# Patient Record
Sex: MALE | Race: WHITE | NOT HISPANIC OR LATINO
[De-identification: names, ages, dates, MRNs, and addresses within clinical notes are randomized per-mention and may not be internally consistent; named-entity substitution may affect disease eponyms.]

---

## 2022-12-02 ENCOUNTER — APPOINTMENT (OUTPATIENT)
Dept: UROLOGY | Facility: CLINIC | Age: 22
End: 2022-12-02

## 2022-12-02 VITALS
WEIGHT: 125 LBS | HEART RATE: 86 BPM | SYSTOLIC BLOOD PRESSURE: 140 MMHG | BODY MASS INDEX: 20.09 KG/M2 | RESPIRATION RATE: 16 BRPM | HEIGHT: 66 IN | OXYGEN SATURATION: 98 % | TEMPERATURE: 98.8 F | DIASTOLIC BLOOD PRESSURE: 88 MMHG

## 2022-12-02 DIAGNOSIS — Z78.9 OTHER SPECIFIED HEALTH STATUS: ICD-10-CM

## 2022-12-02 PROBLEM — Z00.00 ENCOUNTER FOR PREVENTIVE HEALTH EXAMINATION: Status: ACTIVE | Noted: 2022-12-02

## 2022-12-02 PROCEDURE — 99204 OFFICE O/P NEW MOD 45 MIN: CPT

## 2022-12-02 NOTE — ASSESSMENT
[FreeTextEntry1] : He has a very elevated FSH, testicles that are bigger than I expected from such an FSH but smaller and softer than normal, his LH is midrange that his testosterone is low.\par \par Low to moderate keep the blood tests getting a slightly broader panel including a prolactin and a bioavailable testosterone, going to send him for scrotal ultrasound to see if there is any anatomical issues as well they can hopefully check in for a varicocele, I am going to want a post vaginal semen analysis low with an FSH of 24 it may show azoospermia\par \par As well though there appears to be significant male factor here, he cannot have a baby without the female partner and she has not had an evaluation.  Anything we may or may not do to help him improve his fertility potential will have to be decided based on what her fertility potential is as well.  Therefore she needs to go see an artery sooner than later.\par \par I will hold off on genetic testing until I see what the repeat samples as well as the semen analysis show

## 2022-12-02 NOTE — PHYSICAL EXAM
[General Appearance - Well Developed] : well developed [General Appearance - Well Nourished] : well nourished [Normal Appearance] : normal appearance [Well Groomed] : well groomed [General Appearance - In No Acute Distress] : no acute distress [Heart Rate And Rhythm] : Heart rate and rhythm were normal [Edema] : no peripheral edema [Respiration, Rhythm And Depth] : normal respiratory rhythm and effort [Exaggerated Use Of Accessory Muscles For Inspiration] : no accessory muscle use [Auscultation Breath Sounds / Voice Sounds] : lungs were clear to auscultation bilaterally [Abdomen Soft] : soft [Abdomen Tenderness] : non-tender [Abdomen Hernia] : no hernia was discovered [Costovertebral Angle Tenderness] : no ~M costovertebral angle tenderness [Penis Abnormality] : normal circumcised penis [Normal Station and Gait] : the gait and station were normal for the patient's age [] : no rash [Oriented To Time, Place, And Person] : oriented to person, place, and time [Affect] : the affect was normal [Mood] : the mood was normal [FreeTextEntry1] : Very anxious

## 2022-12-02 NOTE — LETTER BODY
[Dear  ___] : Dear ~JAYCEE, [Consult Letter:] : I had the pleasure of evaluating your patient, [unfilled]. [Please see my note below.] : Please see my note below. [Consult Closing:] : Thank you very much for allowing me to participate in the care of this patient.  If you have any questions, please do not hesitate to contact me. [Sincerely,] : Sincerely, [FreeTextEntry2] : Rabbi Renny Singleton\par [address]\[place] [address]

## 2022-12-02 NOTE — HISTORY OF PRESENT ILLNESS
[FreeTextEntry1] : Edi is a 22-year-old male who is  for 2 years and 3 months to Sahara was also 22 years old.  Despite regular relations during the clean portion of her cycle of about twice per week they have not yet conceived.  He had an evaluation which showed low testosterone and elevated FSH so she has not yet seen anyone.  Her cycle lasts about 6 days she can get cramps that can be severe 10 of 10 and she can get cycles where she has no discomfort and normal.\par \par He is 1 of 11 with 4 brothers and 6 sisters.  He has 1 brother who is  a year his sister-in-law is not yet pregnant that he is aware of he has a sister who had problems, however it was his brother in law and thank God they now have 1 child.  His wife Ratna, has 2 brothers and 1 sister  all have children. [Currently Experiencing ___] :  [unfilled]

## 2022-12-02 NOTE — LETTER HEADER
[FreeTextEntry3] : Juana Malik M.D.\par Director Emeritus of Urology\par [facility]\par [address] [place] [address], [address]\[place] [address]

## 2023-01-06 ENCOUNTER — APPOINTMENT (OUTPATIENT)
Dept: UROLOGY | Facility: CLINIC | Age: 23
End: 2023-01-06
Payer: MEDICAID

## 2023-01-06 VITALS
SYSTOLIC BLOOD PRESSURE: 133 MMHG | WEIGHT: 123 LBS | BODY MASS INDEX: 19.77 KG/M2 | HEART RATE: 98 BPM | DIASTOLIC BLOOD PRESSURE: 74 MMHG | HEIGHT: 66 IN

## 2023-01-06 PROCEDURE — 99215 OFFICE O/P EST HI 40 MIN: CPT

## 2023-02-10 ENCOUNTER — APPOINTMENT (OUTPATIENT)
Dept: UROLOGY | Facility: CLINIC | Age: 23
End: 2023-02-10
Payer: MEDICAID

## 2023-02-10 VITALS
HEIGHT: 66 IN | WEIGHT: 123 LBS | BODY MASS INDEX: 19.77 KG/M2 | OXYGEN SATURATION: 98 % | SYSTOLIC BLOOD PRESSURE: 135 MMHG | HEART RATE: 92 BPM | TEMPERATURE: 98.1 F | RESPIRATION RATE: 16 BRPM | DIASTOLIC BLOOD PRESSURE: 80 MMHG

## 2023-02-10 PROCEDURE — 99214 OFFICE O/P EST MOD 30 MIN: CPT

## 2023-02-10 NOTE — HISTORY OF PRESENT ILLNESS
[FreeTextEntry1] : Edi is a 22-year-old male born October 21, 2000 was seen January 6, 2023.  He and his wife Sahara have been  for 2-1/2 years and evaluation had shown low testosterone, elevated FSH, 16 cc testicles and repeat bloods done on December 5 showed an FSH of 3 times normal at 25.6, LH of 6.7, estradiol of 18, sex hormone binding globulin of 42 with bioavailable testosterone being low at 83 (110–5 75) semen analysis in [address] [place] [address] for sperm count of 0 and I felt that neither of the hypergonadism or varicocele will remain issues and I wanted genetic testing and depending on the results we would consider hormonal manipulation I also wanted to repeat the semen analysis to see if that was real.  They have it done the second week in January and they do not know for sure but they think there was nothing showed up.  We are still awaiting that fax but we have the genetic testing

## 2023-02-10 NOTE — LETTER BODY
[Dear  ___] : Dear ~JAYCEE, [Courtesy Letter:] : I had the pleasure of seeing your patient, [unfilled], in my office today. [Please see my note below.] : Please see my note below. [Sincerely,] : Sincerely, [FreeTextEntry2] : Rabbi Renny Singleton\par [address]\[place] [address]

## 2023-02-10 NOTE — ASSESSMENT
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  If we can get his gonadotropins high enough that some sperm gets produced then IVF is on the table if nothing is found that there is always an extended semen analysis and if nothing is found there is always testicular sperm extraction.  Before I go to testicular sperm extraction I would recommend fixing the varicocele but I would hold off on surgery until we get to that point.  I understand it can delay things 3 to 6 months but they are both still young and though I know they really want grandchildren if we could avoid surgery by waiting 6 months it would be worthwhile.  This is especially so as we have no guarantees that the surgery would help this is all in someway elastic check.

## 2023-02-10 NOTE — PHYSICAL EXAM
[General Appearance - Well Developed] : well developed [General Appearance - Well Nourished] : well nourished [Normal Appearance] : normal appearance [Well Groomed] : well groomed [General Appearance - In No Acute Distress] : no acute distress [] : no respiratory distress [Respiration, Rhythm And Depth] : normal respiratory rhythm and effort [Exaggerated Use Of Accessory Muscles For Inspiration] : no accessory muscle use [Oriented To Time, Place, And Person] : oriented to person, place, and time [Affect] : the affect was normal [Mood] : the mood was normal [Normal Station and Gait] : the gait and station were normal for the patient's age [FreeTextEntry1] : Very anxious

## 2023-02-10 NOTE — LETTER BODY
[Dear  ___] : Dear ~JAYCEE, [Courtesy Letter:] : I had the pleasure of seeing your patient, [unfilled], in my office today. [Please see my note below.] : Please see my note below. [Sincerely,] : Sincerely, [FreeTextEntry2] : Rabbi Renny Singleton\par [address]\[place] [address]

## 2023-02-10 NOTE — LETTER HEADER
[FreeTextEntry3] : Juana Malik M.D.\par Director Emeritus of Urology\par Director of Male Infertility\par [facility]\par [address] [place] [address], [facility] [address]\[place] [address]

## 2023-02-10 NOTE — LETTER HEADER
[FreeTextEntry3] : Juana Malik M.D.\par Director Emeritus of Urology\par [facility]\par [address] [place] [address], [address]\[place] [address]

## 2023-02-10 NOTE — ASSESSMENT
[FreeTextEntry1] : He has an FSH that 3 times normal without any sperm seen and low free and bioavailable testosterone with a normal LH.  The estradiol is 18 which is low normal.  He has a ? small left varicocele bilaterally small testicles.\par \par I do not think a varicocele is the issue and though the hypOgonadism is an issue I do not think that is the cause of the subfertility.  We need genetic testing and depending on the results we may consider manipulating his hormones and then seeing if something works.  The COUPLE asked to whether or not there will be a need or in fact any utility to testicular sperm extraction and to a large extent would be predicated on the genetic testing\par \par We will also repeat the semen analysis to see if this is indeed azo or oligospermia\par \par I reviewed the genetic issue such as a balanced translocation, y microdeletions, et.,  we discussed the pituitary gonadal axis including that the FSH being elevated is not the problem the FSH being elevated means the testicle is not properly producing sperm and the pituitary responds to that.  As well we discussed the LH - Leydig cell axis and with the LH in the normal range why is the testosterone so low.  We can stimulate the testicle to make more testosterone but in the absence of genetic material that will not be of benefit.  Please note an extended semen analysis may also be of benefit in the future but right now we are not doing that\par \par We will get a repeat semen analysis, repeat hormones, get Y microdeletions and chromosome analysis and then they will come back.  We will then decide if this is something that can be handled medically i.e. Clomid, hCG etc., will we need to fix the varicocele, and or do we need an extended semen analysis and/or microsurgical dissection with testicular sperm extraction or worst-case scenario if he is Sertoli cell only\par \par Finally though the exam was acceptable I am going to order CF testing\par

## 2023-02-10 NOTE — HISTORY OF PRESENT ILLNESS
[FreeTextEntry1] : Edi is a 22-year-old male born October 24, 2000 initially seen December 2, 2022 for male infertility.  He had his wife Sahara all  for almost 2-1/2 years without conception.  On evaluation done before he saw me showed a low testosterone and elevated FSH with the exam showing small small testicles at about 16 cc.  The LH was in the mid range with a low testosterone and we elected to get a broader panel including a prolactin of bioavailable as well as a scrotal ultrasound we wanted a semen analysis post vaginal if necessary with the semen collection device is possible that was done on December 16, 2022 and depending on the results we would consider genetic testing and in the meantime his wife should see a reproductive endocrinologist; she has not gone as of yet.

## 2023-02-24 ENCOUNTER — NON-APPOINTMENT (OUTPATIENT)
Age: 23
End: 2023-02-24

## 2023-03-17 ENCOUNTER — APPOINTMENT (OUTPATIENT)
Dept: UROLOGY | Facility: CLINIC | Age: 23
End: 2023-03-17
Payer: MEDICAID

## 2023-03-17 VITALS
TEMPERATURE: 96.9 F | HEIGHT: 66 IN | DIASTOLIC BLOOD PRESSURE: 79 MMHG | WEIGHT: 128 LBS | BODY MASS INDEX: 20.57 KG/M2 | SYSTOLIC BLOOD PRESSURE: 127 MMHG | HEART RATE: 70 BPM | RESPIRATION RATE: 16 BRPM | OXYGEN SATURATION: 98 %

## 2023-03-17 DIAGNOSIS — E80.6 OTHER DISORDERS OF BILIRUBIN METABOLISM: ICD-10-CM

## 2023-03-17 PROCEDURE — 99214 OFFICE O/P EST MOD 30 MIN: CPT

## 2023-03-17 NOTE — HISTORY OF PRESENT ILLNESS
[FreeTextEntry1] : Edi is a 22-year-old male born October 21, 2000 last seen February 10, 2023.  His evaluation had shown low testosterone with an elevated FSH and testing showed no obvious genetic cause with an LH in the upper range FSH 3 times normal and low testosterone.  The question we had was with Clomid raising his LH and FSH even higher, with fixing his varicocele we have any benefit and we decided trying Clomid which is a low risk option is of unproven benefit and essentially last ditch effort.  If it fails he can go for testicular sperm extraction if it works we can hope that an extended semen analysis might find something.  Whether or not the varicocele would be of benefit as a separate issue.  We arranged for Clomid 50 mg every other day, repeat blood tests and then we will see if it is worth continuing with the Clomid, going for microsurgical dissection or going for a varicocele repair and then a microsurgical dissection if the varicocele repair does not think.\par \par Blood tests were done on March 9, 2023 and is here for review he tells me subjectively he feels no change.

## 2023-03-17 NOTE — PHYSICAL EXAM
[General Appearance - Well Developed] : well developed [General Appearance - Well Nourished] : well nourished [Normal Appearance] : normal appearance [Well Groomed] : well groomed [General Appearance - In No Acute Distress] : no acute distress [Abdomen Soft] : soft [Abdomen Tenderness] : non-tender [Abdomen Hernia] : no hernia was discovered [Costovertebral Angle Tenderness] : no ~M costovertebral angle tenderness [Heart Rate And Rhythm] : Heart rate and rhythm were normal [Edema] : no peripheral edema [] : no respiratory distress [Respiration, Rhythm And Depth] : normal respiratory rhythm and effort [Exaggerated Use Of Accessory Muscles For Inspiration] : no accessory muscle use [Oriented To Time, Place, And Person] : oriented to person, place, and time [Affect] : the affect was normal [Not Anxious] : not anxious [Mood] : the mood was normal [Normal Station and Gait] : the gait and station were normal for the patient's age [FreeTextEntry1] : Fields of View were normal

## 2023-03-17 NOTE — LETTER BODY
[Dear  ___] : Dear ~JAYCEE, [Courtesy Letter:] : I had the pleasure of seeing your patient, [unfilled], in my office today. [Please see my note below.] : Please see my note below. [Sincerely,] : Sincerely, [FreeTextEntry2] : Rabbi Renny Singleton\par [address]\[place] [address]

## 2023-03-17 NOTE — ASSESSMENT
[FreeTextEntry1] : The testosterone is completely normal if anything is towards the upper side it is hard to believe but I will take it.  The estradiol is a little high at 43 but I rather continue to hyper drive him and do what we can.  Please note the bilirubin which was 1.2 total before is now 1.8 with both the direct and indirect going up a little.  I am going to repeat the liver function tests and have him speak to his PCP about it.  I really do not want to stop the Clomid unless we have to as I do not really have much else other than injectables that is both uncomfortable and very expensive.  For now he will continue the Clomid at 50 mg every other day we will get hormones in 2-1/2 and he will see me in 3 months.  If his rabbi wants we can get a semen analysis in 3 months that we can usually take 6 months before we see a change

## 2023-03-17 NOTE — LETTER HEADER
[FreeTextEntry3] : Juana Malik M.D.\par Director Emeritus of Urology\par Director of Male Infertility\par [facility]\par [address] [place] [address], [facility] [address]\[place] [address]

## 2023-03-21 ENCOUNTER — NON-APPOINTMENT (OUTPATIENT)
Age: 23
End: 2023-03-21

## 2023-06-23 ENCOUNTER — APPOINTMENT (OUTPATIENT)
Dept: UROLOGY | Facility: CLINIC | Age: 23
End: 2023-06-23
Payer: MEDICAID

## 2023-06-23 VITALS
OXYGEN SATURATION: 96 % | RESPIRATION RATE: 14 BRPM | WEIGHT: 128 LBS | SYSTOLIC BLOOD PRESSURE: 136 MMHG | DIASTOLIC BLOOD PRESSURE: 80 MMHG | HEIGHT: 66 IN | TEMPERATURE: 98.2 F | BODY MASS INDEX: 20.57 KG/M2 | HEART RATE: 73 BPM

## 2023-06-23 PROCEDURE — 99214 OFFICE O/P EST MOD 30 MIN: CPT

## 2023-06-23 NOTE — HISTORY OF PRESENT ILLNESS
[FreeTextEntry1] : Edi is a 22-year-old male born October 21, 2000 last seen February 10, 2023.  His evaluation had shown low testosterone with an elevated FSH and testing showed no obvious genetic cause with an LH in the upper range FSH 3 times normal and low testosterone.  The question we had was with Clomid raising his LH and FSH even higher, would fixing his  borderline unilateral left varicocele have any benefit.\par \par we decided trying Clomid which is a low risk option is of unproven benefit and essentially last ditch effort.  If it fails he can go for testicular sperm extraction if it works we can hope that an extended semen analysis might find something.  Whether or not the varicocele would be of benefit as a separate issue.  We arranged for Clomid 50 mg every other day, repeat blood tests and then we will see if it is worth continuing with the Clomid, going for microsurgical dissection or going for a varicocele repair and then a microsurgical dissection if the varicocele repair does not think.\par \par Blood tests were done on 06/14/2023 and is here for review he tells me subjectively he feels no change.

## 2023-06-23 NOTE — ASSESSMENT
[FreeTextEntry1] : His testosterone is good on Clomid. His labs demonstrated high FSH almost 3 times the upper limit of normal and post vaginal semen analysis shows complete azoospermia.\par \par It is likely that he will need an MDTESE however, prior to that he should have an extended semen analysis.  If they can find sperm with an extended semen analysis in the same.  They understand that at best the scrotum they can fight if they fully will be utilized IVF with intracytoplasmic spermatozoa injection\par \par We will keep him on Clomid 50 mg every other day and obtain blood work and 2 and half months with follow-up shortly after.\par \par He will discuss extended semen analysis with his Rabbi and if necessary have his rabbi.  A copy of this note has been given to him

## 2023-06-23 NOTE — LETTER BODY
[Dear  ___] : Dear ~JAYCEE, [Courtesy Letter:] : I had the pleasure of seeing your patient, [unfilled], in my office today. [Please see my note below.] : Please see my note below. [Sincerely,] : Sincerely, [FreeTextEntry2] : Rabbi Renny Singleton\par [address]\[place] [address]

## 2023-06-23 NOTE — LETTER HEADER
[FreeTextEntry3] : Juana Malik M.D.\par Director Emeritus of Urology\par Director of Male Infertility\par [facility]\par [address] [place] [address], [facility] [address]\[place] [address]

## 2023-10-13 ENCOUNTER — APPOINTMENT (OUTPATIENT)
Dept: UROLOGY | Facility: CLINIC | Age: 23
End: 2023-10-13
Payer: MEDICAID

## 2023-10-13 VITALS
HEART RATE: 67 BPM | HEIGHT: 66 IN | SYSTOLIC BLOOD PRESSURE: 121 MMHG | DIASTOLIC BLOOD PRESSURE: 78 MMHG | RESPIRATION RATE: 18 BRPM | WEIGHT: 130 LBS | TEMPERATURE: 98 F | OXYGEN SATURATION: 97 % | BODY MASS INDEX: 20.89 KG/M2

## 2023-10-13 PROCEDURE — 99215 OFFICE O/P EST HI 40 MIN: CPT

## 2023-10-13 RX ORDER — CLOMIPHENE CITRATE 50 MG/1
50 TABLET ORAL
Qty: 15 | Refills: 3 | Status: ACTIVE | COMMUNITY
Start: 2023-02-10 | End: 1900-01-01

## 2023-10-31 ENCOUNTER — APPOINTMENT (OUTPATIENT)
Dept: UROLOGY | Facility: HOSPITAL | Age: 23
End: 2023-10-31

## 2024-03-01 ENCOUNTER — APPOINTMENT (OUTPATIENT)
Dept: UROLOGY | Facility: CLINIC | Age: 24
End: 2024-03-01
Payer: MEDICAID

## 2024-03-01 VITALS
TEMPERATURE: 98.2 F | SYSTOLIC BLOOD PRESSURE: 129 MMHG | WEIGHT: 131 LBS | DIASTOLIC BLOOD PRESSURE: 74 MMHG | BODY MASS INDEX: 21.05 KG/M2 | HEART RATE: 89 BPM | HEIGHT: 66 IN

## 2024-03-01 DIAGNOSIS — R79.89 OTHER SPECIFIED ABNORMAL FINDINGS OF BLOOD CHEMISTRY: ICD-10-CM

## 2024-03-01 PROCEDURE — 99214 OFFICE O/P EST MOD 30 MIN: CPT

## 2024-03-01 PROCEDURE — G2211 COMPLEX E/M VISIT ADD ON: CPT | Mod: NC,1L

## 2024-03-01 RX ORDER — CLOMIPHENE CITRATE 100 %
POWDER (GRAM) MISCELLANEOUS
Qty: 1.5 | Refills: 1 | Status: ACTIVE | COMMUNITY
Start: 2024-03-01 | End: 1900-01-01

## 2024-03-01 NOTE — LETTER BODY
[Dear  ___] : Dear ~JAYCEE, [Courtesy Letter:] : I had the pleasure of seeing your patient, [unfilled], in my office today. [Please see my note below.] : Please see my note below. [Sincerely,] : Sincerely, [FreeTextEntry2] : Rabbi Renny Singleton\par  [address]\[place] [address]

## 2024-03-01 NOTE — PHYSICAL EXAM
[General Appearance - Well Developed] : well developed [General Appearance - Well Nourished] : well nourished [Normal Appearance] : normal appearance [Well Groomed] : well groomed [General Appearance - In No Acute Distress] : no acute distress [Heart Rate And Rhythm] : heart rate and rhythm were normal [Edema] : no peripheral edema [Respiration, Rhythm And Depth] : normal respiratory rhythm and effort [Exaggerated Use Of Accessory Muscles For Inspiration] : no accessory muscle use [Auscultation Breath Sounds / Voice Sounds] : lungs were clear to auscultation bilaterally [Abdomen Soft] : soft [Abdomen Tenderness] : non-tender [Costovertebral Angle Tenderness] : no ~M costovertebral angle tenderness [Normal Station and Gait] : the gait and station were normal for the patient's age [] : no rash [No Focal Deficits] : no focal deficits [Oriented To Time, Place, And Person] : oriented to person, place, and time [Affect] : the affect was normal [Mood] : the mood was normal [Not Anxious] : not anxious

## 2024-03-01 NOTE — ASSESSMENT
[FreeTextEntry1] : The blood test on the same as exam is the same he is feeling is the same and I do not disagree embolization versus surgery though the success rate with embolization is not as high as that of surgery if it does not work we can always go to surgery.  As well as I reviewed with them I am not someone who at this point feels that the varicocele should be treated but if that is the route we are going I asked that this alternative.  The next question I have is should he get an extended semen analysis again now that he has been on the medication for another 4 months.  The most we have to lose is money.  If they have sperm then it avoids the embolization.  They ask even if he has sperm should he do the embolization and make it even more better in the answers I will think is necessary in the first place and if he already has sperm and nothing is without risk I would just use the sperm that is found.  For now I will continue him on the Clomid we will get blood in 3-1/2 and see me in 4 months at the time I see him the next time I hope will have good news.  I do not want him stopping the Clomid even if cogwheeling she becomes pregnant as there is still the issue of testosterone levels that are necessary for general male health.

## 2024-03-01 NOTE — HISTORY OF PRESENT ILLNESS
[FreeTextEntry1] : Edi is a 22-year-old male born [birth year] last seen on 10/13/2023.  His evaluation had shown a low testosterone with an elevated FSH with no obvious genetic cause. The LH was upper range of the FSH 3 times normal we wondered if with Clomid raising them even further plus minus fixing his unilateral left varicocele will have any benefit. We started with the Clomid and discussed whether and if that does not yield ejaculated sperm without or with an extended semen analysis for testicular sperm extraction would be of benefit and whether or not fixing the varicocele would be worthwhile and then going for the extended semen analysis plus minus microsurgical dissection that was a totality the later study showed that there is no improvement yield. At his last visit we decided to keep him on the Clomid 50 mg every other day get blood work and go on from there. He was going to discuss an extended semen analysis with his rabbi and if needed have the rabbi call me. They saw Dr. Ray who feels that he needs an extended testicular sperm extraction but feels that the varicocele should be fixed first acknowledging that current data shows limited benefit. However he feels if there is any benefit it should be done prior as microsurgical testicular sperm extraction is really one-shot deal. They do acknowledge that if they do the varicocele they would wait at least 6 months and then do a repeat extended semen analysis before they consider a microsurgical approach.   We discussed the risk benefits of varicocele surgery, please see below including the fact that no matter how many anyone has done there is no operation that has 0 risk.   We reviewed  the fact that the likelihood is that the operation will not yield a change in sperm production. However they had spoken with the rabbi and decided what ever attempts can be made to try and improve sperm production especially if it we might therby avoid the need to operate on the testicle itself, as long as the surgery has an acceptable risk profile, is worth doing.   Rabbi Landau decided that they should go with embolization rather than surgery and they are waiting for insurance approval as the specialist who does the embolization does not see any reason to meet with them unless they are going to go ahead with the procedure and I think that is appropriate.  In the meantime he is staying on the Clomid at 50 mg every other day, it has been 4 months since I last saw him and he had blood test done every 26 2024 and is here for review and exam and if necessary refill  Total testosterone was 970 Free testosterone was 118 Bioavailable testosterone was 284 Sex hormone binding globulin was 41 with albumin of 5.3 Hemoglobin was 14.4 with a platelet count of 163,000 FSH was 26.9 Estradiol was 42 LH was 6.7 Liver function tests were normal [Currently Experiencing ___] :  [unfilled]

## 2024-03-01 NOTE — LETTER HEADER
[FreeTextEntry3] : Juana Malik MD [facility] [facility], [address] [place] [address]

## 2024-06-24 ENCOUNTER — APPOINTMENT (OUTPATIENT)
Dept: UROLOGY | Facility: CLINIC | Age: 24
End: 2024-06-24
Payer: MEDICAID

## 2024-06-24 VITALS
TEMPERATURE: 98.2 F | DIASTOLIC BLOOD PRESSURE: 87 MMHG | HEART RATE: 79 BPM | BODY MASS INDEX: 20.89 KG/M2 | OXYGEN SATURATION: 99 % | HEIGHT: 66 IN | WEIGHT: 130 LBS | SYSTOLIC BLOOD PRESSURE: 144 MMHG

## 2024-06-24 DIAGNOSIS — I86.1 SCROTAL VARICES: ICD-10-CM

## 2024-06-24 DIAGNOSIS — N46.01 ORGANIC AZOOSPERMIA: ICD-10-CM

## 2024-06-24 DIAGNOSIS — N46.9 MALE INFERTILITY, UNSPECIFIED: ICD-10-CM

## 2024-06-24 PROCEDURE — 99204 OFFICE O/P NEW MOD 45 MIN: CPT

## 2024-06-24 NOTE — HISTORY OF PRESENT ILLNESS
[FreeTextEntry1] : Dear Dr. Malik (Urologist)  Thank you so much for the referral to help care for your patient.  Chief Complaint: Varicocele  Date of first visit: 06/24/2024  YAMIL KELLER  is a 23 year old [employer] gentleman who presents for LEFT side varicocele.  They are helped by Rabbi Landau.  The patient reports pain.  on clomiphen citrate.  no family hx of cystic fibrosis.   SA 6/23/23 Vol 2.6, azospermia.    SA 6 2024 as per patient still has azospermia   U/S Hx: U/S scrotum doppler     12/02/2022 small left varicoceles. - standing with valsva 2.9 reversal of flow present   (2/26/24) Total testosterone was 970  Free testosterone was 118 Bioavailable testosterone was 284 Sex hormone binding globulin was 41 with albumin of 5.3 Hemoglobin was 14.4 with a platelet count of 163,000 FSH was 26.9 Estradiol was 42 LH was 6.7 Liver function tests were normal  The patient denies fevers, chills, nausea and or vomiting and no unexplained weight loss.  All pertinent laboratory, films and physician notes were reviewed.  Questionnaire results were discussed with patient.

## 2024-06-24 NOTE — ASSESSMENT
[FreeTextEntry1] : 24 y/o male with left varicocele with azospermia on clomiphen citrate. Grade I with pain.  i did discuss that given non palp but pain ew can consider treatment with sedation.  Semen analysis - report - Dr Malik recommended surgery and they have reviewed SA but I don't have the actual report to confirm non obstructive azospermia.   1.Repeat US offered - the patient does not tolerate US of the scrotum.  This is a challenge because the prior US is old.  Clinical options in TESE vs (treatment of varicocele) -  The question we had was with Clomid raising his LH and FSH even higher, with fixing his varicocele we have any benefit and we decided trying Clomid which is a low risk option is of unproven benefit and essentially last ditch effort. If it fails he can go for testicular sperm extraction if it works we can hope that an extended semen analysis might find something.  The Rabbi recommended 1st step treat the varicocele.   We discussed disease process and treatment options for symptomatic varicoceles and infertility. The patient understands the scrotal pain may resolve the heaviness that he feels in his scrotum however improving his fertility is not 100% guaranteed. There are studies that show there is improvement in semen parameters after treatment of the varicocele.  We discussed the risks and benefits alternatives associated with gonadal embolization regards to nontarget embolization complications of access and migration of coils. We also discussed the success rates of procedures in comparison to surgery.  We had an in depth conversation regarding the benefit of varicocelectomy today. He understands the literature which overwhelming reports a benefit in improvement in semen parameters. He also understands that there is limited data in terms of spontaneous pregnancy and take home baby rates.There is good data suggesting improved semen parameters which can possibly obviate the need for future IVF and has been shown to improve IVF outcomes in selected patients. We discussed the recent results of a Rell review which reported that one natural pregnancy is achieved for every 3-5 varicocele repairs over an unknown time frame. We also spoke about the fact that there is a 3 to 6 month delay before improvement is seen in semen parameters. The role of advancing maternal age was discussed in depth.  Surgical risks, including a risk of infection, injury to the testicular artery (extremely rare), injury to the vas deferens and hydrocele were discussed, as was post operative pain and pain control. Post operative skin numbness in the anterior thigh/lateral scrotum were also discussed.  Malampatti I  1. Motrin 800 mg po BID start of the procedure 2. Labs C,7 3. Follow up 1 month after procedure 4. 4-6 months US and Semen analysis (69 days for new sperm to mature)  Thank you very much for allowing me to assist in the care of this patient. Please do not hesitate to contact me with any additional questions or concerns.   Sincerely,   Pranay Diaz D.O. Professor of Urology and Radiology  of Urology at [facility] Director for Prostate Cancer [address] [place] [address], 5th Floor [facility], [facility] Phone: [phone].

## 2024-06-24 NOTE — PHYSICAL EXAM
[General Appearance - Well Developed] : well developed [General Appearance - Well Nourished] : well nourished [Not Anxious] : not anxious [de-identified] : bilateral vas deferense present.

## 2024-06-25 LAB
ANION GAP SERPL CALC-SCNC: 14 MMOL/L
BUN SERPL-MCNC: 9 MG/DL
CALCIUM SERPL-MCNC: 10.1 MG/DL
CHLORIDE SERPL-SCNC: 102 MMOL/L
CO2 SERPL-SCNC: 25 MMOL/L
CREAT SERPL-MCNC: 0.77 MG/DL
EGFR: 128 ML/MIN/1.73M2
GLUCOSE SERPL-MCNC: 102 MG/DL
HCT VFR BLD CALC: 43.4 %
HGB BLD-MCNC: 14.8 G/DL
MCHC RBC-ENTMCNC: 31.2 PG
MCHC RBC-ENTMCNC: 34.1 GM/DL
MCV RBC AUTO: 91.4 FL
PLATELET # BLD AUTO: 199 K/UL
POTASSIUM SERPL-SCNC: 4.2 MMOL/L
RBC # BLD: 4.75 M/UL
RBC # FLD: 12.1 %
SODIUM SERPL-SCNC: 141 MMOL/L
WBC # FLD AUTO: 9.86 K/UL

## 2024-07-02 ENCOUNTER — APPOINTMENT (OUTPATIENT)
Dept: UROLOGY | Facility: HOSPITAL | Age: 24
End: 2024-07-02

## 2024-07-02 ENCOUNTER — APPOINTMENT (OUTPATIENT)
Dept: INTERVENTIONAL RADIOLOGY/VASCULAR | Facility: HOSPITAL | Age: 24
End: 2024-07-02

## 2024-07-02 ENCOUNTER — RESULT REVIEW (OUTPATIENT)
Age: 24
End: 2024-07-02

## 2024-07-02 ENCOUNTER — OUTPATIENT (OUTPATIENT)
Dept: OUTPATIENT SERVICES | Facility: HOSPITAL | Age: 24
LOS: 1 days | End: 2024-07-02
Payer: COMMERCIAL

## 2024-07-02 VITALS
HEIGHT: 66 IN | SYSTOLIC BLOOD PRESSURE: 130 MMHG | WEIGHT: 130.07 LBS | HEART RATE: 93 BPM | TEMPERATURE: 99 F | OXYGEN SATURATION: 98 % | RESPIRATION RATE: 16 BRPM | DIASTOLIC BLOOD PRESSURE: 79 MMHG

## 2024-07-02 PROCEDURE — 36012 PLACE CATHETER IN VEIN: CPT

## 2024-07-02 PROCEDURE — C1889: CPT

## 2024-07-02 PROCEDURE — 75831 VEIN X-RAY KIDNEY: CPT

## 2024-07-02 PROCEDURE — 37241 VASC EMBOLIZE/OCCLUDE VENOUS: CPT

## 2024-07-02 PROCEDURE — C1887: CPT

## 2024-07-02 PROCEDURE — 75831 VEIN X-RAY KIDNEY: CPT | Mod: 26,LT

## 2024-07-02 PROCEDURE — C1894: CPT

## 2024-07-02 PROCEDURE — C1769: CPT

## 2024-07-12 ENCOUNTER — APPOINTMENT (OUTPATIENT)
Dept: UROLOGY | Facility: CLINIC | Age: 24
End: 2024-07-12
Payer: MEDICAID

## 2024-07-12 VITALS
BODY MASS INDEX: 20.89 KG/M2 | SYSTOLIC BLOOD PRESSURE: 120 MMHG | TEMPERATURE: 97.5 F | WEIGHT: 130 LBS | HEIGHT: 66 IN | DIASTOLIC BLOOD PRESSURE: 80 MMHG | HEART RATE: 86 BPM

## 2024-07-12 DIAGNOSIS — I86.1 SCROTAL VARICES: ICD-10-CM

## 2024-07-12 DIAGNOSIS — R79.89 OTHER SPECIFIED ABNORMAL FINDINGS OF BLOOD CHEMISTRY: ICD-10-CM

## 2024-07-12 DIAGNOSIS — N46.01 ORGANIC AZOOSPERMIA: ICD-10-CM

## 2024-07-12 DIAGNOSIS — N46.9 MALE INFERTILITY, UNSPECIFIED: ICD-10-CM

## 2024-07-12 PROCEDURE — 99215 OFFICE O/P EST HI 40 MIN: CPT

## 2024-07-12 PROCEDURE — G2211 COMPLEX E/M VISIT ADD ON: CPT | Mod: NC,1L

## 2024-07-22 ENCOUNTER — APPOINTMENT (OUTPATIENT)
Dept: UROLOGY | Facility: CLINIC | Age: 24
End: 2024-07-22

## 2024-07-26 ENCOUNTER — APPOINTMENT (OUTPATIENT)
Dept: UROLOGY | Facility: CLINIC | Age: 24
End: 2024-07-26
Payer: MEDICAID

## 2024-07-26 VITALS
HEART RATE: 81 BPM | HEIGHT: 66 IN | RESPIRATION RATE: 16 BRPM | OXYGEN SATURATION: 98 % | TEMPERATURE: 98.5 F | SYSTOLIC BLOOD PRESSURE: 116 MMHG | DIASTOLIC BLOOD PRESSURE: 74 MMHG | BODY MASS INDEX: 20.89 KG/M2 | WEIGHT: 130 LBS

## 2024-07-26 DIAGNOSIS — R79.89 OTHER SPECIFIED ABNORMAL FINDINGS OF BLOOD CHEMISTRY: ICD-10-CM

## 2024-07-26 DIAGNOSIS — N46.01 ORGANIC AZOOSPERMIA: ICD-10-CM

## 2024-07-26 DIAGNOSIS — N46.9 MALE INFERTILITY, UNSPECIFIED: ICD-10-CM

## 2024-07-26 DIAGNOSIS — I86.1 SCROTAL VARICES: ICD-10-CM

## 2024-07-26 PROCEDURE — G2211 COMPLEX E/M VISIT ADD ON: CPT | Mod: NC,1L

## 2024-07-26 PROCEDURE — 99214 OFFICE O/P EST MOD 30 MIN: CPT

## 2024-07-26 NOTE — LETTER HEADER
[FreeTextEntry3] : Juana Malik MD [facility], [address] [place] [address]

## 2024-07-26 NOTE — PHYSICAL EXAM
[General Appearance - Well Developed] : well developed [General Appearance - Well Nourished] : well nourished [Normal Appearance] : normal appearance [Well Groomed] : well groomed [General Appearance - In No Acute Distress] : no acute distress [Heart Rate And Rhythm] : heart rate and rhythm were normal [Edema] : no peripheral edema [Respiration, Rhythm And Depth] : normal respiratory rhythm and effort [Exaggerated Use Of Accessory Muscles For Inspiration] : no accessory muscle use [Auscultation Breath Sounds / Voice Sounds] : lungs were clear to auscultation bilaterally [Abdomen Soft] : soft [Abdomen Tenderness] : non-tender [Costovertebral Angle Tenderness] : no ~M costovertebral angle tenderness [Normal Station and Gait] : the gait and station were normal for the patient's age [] : no rash [No Focal Deficits] : no focal deficits [Oriented To Time, Place, And Person] : oriented to person, place, and time [Affect] : the affect was normal [Mood] : the mood was normal [Not Anxious] : not anxious [FreeTextEntry1] : 20.98 [de-identified] : i do nto feel reflux [de-identified] : Fields of View were normal

## 2024-07-26 NOTE — LETTER BODY
[Dear  ___] : Dear ~JAYCEE, [Courtesy Letter:] : I had the pleasure of seeing your patient, [unfilled], in my office today. [Please see my note below.] : Please see my note below. [Sincerely,] : Sincerely, [FreeTextEntry2] : Rabbi Renny Singleton\par  [address]\[place] [address]

## 2024-07-26 NOTE — HISTORY OF PRESENT ILLNESS
[FreeTextEntry1] : Edi is a 23-year-old male born [birth year] last seen on 07/12/2024. His evaluation had shown a low testosterone with an elevated FSH with no obvious genetic cause. The LH was upper range of the FSH 3 times normal we wondered if with Clomid raising them even further plus minus fixing his unilateral left varicocele will have any benefit.  His exam was benign I do not have any of the labs that I need. they were to go back to the lab that jed the blood see what is going on as reportedly 3-5 tubes were taken  but they only ran 1   if they have the rest they will get them to me if they did not run them they will have to run them and then they will get that to me. once I have them we will meet over TeleMed.  The blood was in process and they are meeting with me today to review  4/30/2029 2024 with him on Clomid 50 mg every other day Total testosterone was 435 Free testosterone was 42.8 Bioavailable testosterone was 91.8 Sex hormone binding globulin was 46 with an albumin of 4.7 Liver function tests were normal Hemoglobin was 14.3 with a platelet count of 177,000 FSH was 23.6 (1.4-12.8) LH was 4.4 (1.5-5.3) estradiol was 39 [Currently Experiencing ___] :  [unfilled]

## 2024-07-26 NOTE — ASSESSMENT
[FreeTextEntry1] : For total testosterone is okay but his sex hormone binding globulin is near maximal.  The free is borderline above the bioavailable is more than 20% below the lower limit of normal. This is with his FSH is twice normal and the LH in the mid range.  The other issue is his estradiol is near maximal and he is a really skinny ginger.  I want his testosterone in the normal range so we will double the Clomid.  His estradiol may go up too high so we may need to add in Arimidex.  We will decide that at the next visit. Unfortunately because I am adding in a new drug I will need to do a physical exam so he will have to come in hopefully once we get him on a steady dose we can see him every other visit and then see him with a TeleMed.  He has trouble and that his community does not really want the right can file using the Internet because of the left patient but I discussed with him that one of the medical groups that I work with do have access in their office so that patients can use the Internet medical supervision and he will look into it.  The embolization was done in June and 3 months after the embolization was arranged for a post vaginal semen analysis.  I do not see any purpose in doing it before then and they are aware that even if it works 3 months would be the absolute earliest and it usually takes 6 and can take even up to 12.

## 2024-08-07 ENCOUNTER — APPOINTMENT (OUTPATIENT)
Dept: UROLOGY | Facility: CLINIC | Age: 24
End: 2024-08-07

## 2024-08-07 PROCEDURE — 99212 OFFICE O/P EST SF 10 MIN: CPT

## 2024-08-07 NOTE — ASSESSMENT
[FreeTextEntry1] : 24 y/o male with left varicocele with azospermia on clomiphen citrate. Grade I with pain. i did discuss that given non palp but pain we can consider treatment with sedation.  He has no pain post op.  Semen analysis - report - Dr Malik recommended surgery and they have reviewed SA but I don't have the actual report to confirm non obstructive azospermia.  s/p LEFT SIDE VARICOCELE EMBO on 7/2/24  - 5 months repeat semen analysis at [facility] and send results - no follow up needed if SA is poor - consider TESE with Dr Bowling.  Thank you very much for allowing me to assist in the care of this patient. Please do not hesitate to contact me with any additional questions or concerns.      Sincerely,     Pranay Diaz D.O. Professor of Urology and Radiology  of Urology at [facility] Director for Prostate Cancer [address] [place] [address], 5th Floor [facility], [facility] Phone: [phone]

## 2024-08-07 NOTE — HISTORY OF PRESENT ILLNESS
[FreeTextEntry1] : Dear Dr. Malik (Urologist)  Thank you so much for the referral to help care for your patient.  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 They are helped by Rabbi Landau. The patient reports pain. on clomiphen citrate. no family hx of cystic fibrosis.  He is doing well 1 month post op - he has no pain which he had pre-treatment.    SA 6/23/23 Vol 2.6, azospermia.  SA 6 2024 as per patient still has azospermia  U/S Hx: U/S scrotum doppler 12/02/2022 small left varicoceles. - standing with valsva 2.9 reversal of flow present  (2/26/24) Total testosterone was 970 Free testosterone was 118 Bioavailable testosterone was 284 Sex hormone binding globulin was 41 with albumin of 5.3 Hemoglobin was 14.4 with a platelet count of 163,000 FSH was 26.9 Estradiol was 42 LH was 6.7 Liver function tests were normal  The patient denies fevers, chills, nausea and or vomiting and no unexplained weight loss.  All pertinent laboratory, films and physician notes were reviewed. Questionnaire results were discussed with patient.

## 2024-08-07 NOTE — ASSESSMENT
[FreeTextEntry1] : 22 y/o male with left varicocele with azospermia on clomiphen citrate. Grade I with pain. i did discuss that given non palp but pain we can consider treatment with sedation.  He has no pain post op.  Semen analysis - report - Dr Malik recommended surgery and they have reviewed SA but I don't have the actual report to confirm non obstructive azospermia.  s/p LEFT SIDE VARICOCELE EMBO on 7/2/24  - 5 months repeat semen analysis at [facility] and send results - no follow up needed if SA is poor - consider TESE with Dr Bowling.  Thank you very much for allowing me to assist in the care of this patient. Please do not hesitate to contact me with any additional questions or concerns.      Sincerely,     Pranay Diaz D.O. Professor of Urology and Radiology  of Urology at [facility] Director for Prostate Cancer [address] [place] [address], 5th Floor [facility], [facility] Phone: [phone]

## 2024-08-07 NOTE — HISTORY OF PRESENT ILLNESS
[FreeTextEntry1] : Dear Dr. Malik (Urologist)  Thank you so much for the referral to help care for your patient.  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 They are helped by Rabbi Landau. The patient reports pain. on clomiphen citrate. no family hx of cystic fibrosis.  He is doing well 1 month post op - he has no pain which he had pre-treatment.    SA 6/23/23 Vol 2.6, azospermia.  SA 6 2024 as per patient still has azospermia  U/S Hx: U/S scrotum doppler 12/02/2022 small left varicoceles. - standing with valsva 2.9 reversal of flow present  (2/26/24) Total testosterone was 970 Free testosterone was 118 Bioavailable testosterone was 284 Sex hormone binding globulin was 41 with albumin of 5.3 Hemoglobin was 14.4 with a platelet count of 163,000 FSH was 26.9 Estradiol was 42 LH was 6.7 Liver function tests were normal  The patient denies fevers, chills, nausea and or vomiting and no unexplained weight loss.  All pertinent laboratory, films and physician notes were reviewed. Questionnaire results were discussed with patient.

## 2024-08-07 NOTE — HISTORY OF PRESENT ILLNESS
[FreeTextEntry1] : Dear Dr. Malik (Urologist)  Thank you so much for the referral to help care for your patient.  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 They are helped by Rabbi Landau. The patient reports pain. on clomiphen citrate. no family hx of cystic fibrosis.  He is doing well 1 month post op - he has no pain which he had pre-treatment.    SA 6/23/23 Vol 2.6, azospermia.  SA 6 2024 as per patient still has azospermia  U/S Hx: U/S scrotum doppler 12/02/2022 small left varicoceles. - standing with valsva 2.9 reversal of flow present  (2/26/24) Total testosterone was 970 Free testosterone was 118 Bioavailable testosterone was 284 Sex hormone binding globulin was 41 with albumin of 5.3 Hemoglobin was 14.4 with a platelet count of 163,000 FSH was 26.9 Estradiol was 42 LH was 6.7 Liver function tests were normal  The patient denies fevers, chills, nausea and or vomiting and no unexplained weight loss.  All pertinent laboratory, films and physician notes were reviewed. Questionnaire results were discussed with patient.

## 2024-08-07 NOTE — ASSESSMENT
[FreeTextEntry1] : 24 y/o male with left varicocele with azospermia on clomiphen citrate. Grade I with pain. i did discuss that given non palp but pain we can consider treatment with sedation.  He has no pain post op.  Semen analysis - report - Dr Malik recommended surgery and they have reviewed SA but I don't have the actual report to confirm non obstructive azospermia.  s/p LEFT SIDE VARICOCELE EMBO on 7/2/24  - 5 months repeat semen analysis at [facility] and send results - no follow up needed if SA is poor - consider TESE with Dr Bowling.  Thank you very much for allowing me to assist in the care of this patient. Please do not hesitate to contact me with any additional questions or concerns.      Sincerely,     Pranay Diaz D.O. Professor of Urology and Radiology  of Urology at [facility] Director for Prostate Cancer [address] [place] [address], 5th Floor [facility], [facility] Phone: [phone]

## 2024-09-06 ENCOUNTER — APPOINTMENT (OUTPATIENT)
Dept: UROLOGY | Facility: CLINIC | Age: 24
End: 2024-09-06
Payer: MEDICAID

## 2024-09-06 DIAGNOSIS — R79.89 OTHER SPECIFIED ABNORMAL FINDINGS OF BLOOD CHEMISTRY: ICD-10-CM

## 2024-09-06 DIAGNOSIS — N46.01 ORGANIC AZOOSPERMIA: ICD-10-CM

## 2024-09-06 DIAGNOSIS — N46.9 MALE INFERTILITY, UNSPECIFIED: ICD-10-CM

## 2024-09-06 PROCEDURE — G2211 COMPLEX E/M VISIT ADD ON: CPT | Mod: NC

## 2024-09-06 PROCEDURE — 99214 OFFICE O/P EST MOD 30 MIN: CPT

## 2024-09-06 NOTE — ASSESSMENT
[FreeTextEntry1] : Exam is benign the testosterone free and bioavailable are normal the total is perhaps 8% above the right like it and the estradiol is about 20% above where I am like it.  However we are trying to really push the envelope to get some sperm so that at the very least they can try IVF and I would rather you be a little high and I push him is much as I can.  They understand the risk and for now I would stay like this and usually I would get blood in 3 months but because he is a little high we will get it into.  If he could work out that he can get to a computer, he works with does have access we can do a telemed otherwise he will need to come in

## 2024-09-06 NOTE — LETTER BODY
[Dear  ___] : Dear ~JAYCEE, [Courtesy Letter:] : I had the pleasure of seeing your patient, [unfilled], in my office today. [Please see my note below.] : Please see my note below. [Sincerely,] : Sincerely, [FreeTextEntry2] : Rabbi Renny Singleton\par  [address]\[place] [address]

## 2024-09-06 NOTE — HISTORY OF PRESENT ILLNESS
[FreeTextEntry1] : Edi is a 23-year-old male born [birth year] last seen on 07/26/2024. His evaluation had shown a low testosterone with an elevated FSH with no obvious genetic cause. The LH was upper range with the FSH 3 times normal we wondered if with Clomid raising them even further plus minus fixing his unilateral left varicocele will have any benefit.  His exam was benign I do not have any of the labs that I need. they were to go back to the lab that jed the blood see what is going on as reportedly 3-5 tubes were taken  but they only ran 1   if they have the rest they will get them to me if they did not run them they will have to run them and then they will get that to me. once I have them we will meet over TeleMed.  The blood was in process and they are meeting with me today to review  4/30/2029 2024 with him on Clomid 50 mg every other day Total testosterone was 435 Free testosterone was 42.8 Bioavailable testosterone was 91.8 Sex hormone binding globulin was 46 with an albumin of 4.7 Liver function tests were normal Hemoglobin was 14.3 with a platelet count of 177,000 FSH was 23.6 (1.4-12.8) LH was 4.4 (1.5-5.3) estradiol was 39   After the last visit we got the hormones with the bioavailable still being low, the FSH twice normal and the LH she midranged with the estradiol high normal.  We doubled the Clomid and therefore he is coming in here for an exam.  He had the embolization done in June, he recently saw Dr Diaz on August 7 who felt that the procedure had gone well recommended repeat semen analysis in December  He tells me on Clomid 50 mg/day he has not really noticed any change His hormones done on August 29, 2024 Total testosterone was 1248 (250-1100) Free testosterone was 168 Bioavailable testosterone was 397.5 sex hormone binding globulin was at 40 (10-50) with albumin at 5.2 (3.6-5.1 Hemoglobin was 15.2 with a platelet count of 186,000 FSH is now up to 31.2 (1.4-12.8) with the LH at 8.8 Estradiol was 47 (</= 39) Liver function tests were normal   [Currently Experiencing ___] :  [unfilled]

## 2024-09-06 NOTE — PHYSICAL EXAM
[General Appearance - Well Developed] : well developed [General Appearance - Well Nourished] : well nourished [Normal Appearance] : normal appearance [Well Groomed] : well groomed [General Appearance - In No Acute Distress] : no acute distress [Heart Rate And Rhythm] : heart rate and rhythm were normal [Edema] : no peripheral edema [Respiration, Rhythm And Depth] : normal respiratory rhythm and effort [Exaggerated Use Of Accessory Muscles For Inspiration] : no accessory muscle use [Auscultation Breath Sounds / Voice Sounds] : lungs were clear to auscultation bilaterally [Abdomen Soft] : soft [Abdomen Tenderness] : non-tender [Costovertebral Angle Tenderness] : no ~M costovertebral angle tenderness [Normal Station and Gait] : the gait and station were normal for the patient's age [] : no rash [No Focal Deficits] : no focal deficits [Oriented To Time, Place, And Person] : oriented to person, place, and time [Affect] : the affect was normal [Mood] : the mood was normal [Not Anxious] : not anxious [FreeTextEntry1] : 20.98 [de-identified] : i do nto feel reflux [de-identified] : Fields of View were normal

## 2024-09-06 NOTE — LETTER HEADER
[FreeTextEntry3] : Juana Malik MD [facility] [place] [facility], [address] [place] [address]

## 2024-12-20 ENCOUNTER — APPOINTMENT (OUTPATIENT)
Dept: UROLOGY | Facility: CLINIC | Age: 24
End: 2024-12-20
Payer: MEDICAID

## 2024-12-20 VITALS
DIASTOLIC BLOOD PRESSURE: 81 MMHG | BODY MASS INDEX: 21.28 KG/M2 | OXYGEN SATURATION: 98 % | RESPIRATION RATE: 18 BRPM | WEIGHT: 132.4 LBS | HEART RATE: 77 BPM | SYSTOLIC BLOOD PRESSURE: 129 MMHG | HEIGHT: 66 IN

## 2024-12-20 DIAGNOSIS — R79.89 OTHER SPECIFIED ABNORMAL FINDINGS OF BLOOD CHEMISTRY: ICD-10-CM

## 2024-12-20 DIAGNOSIS — I86.1 SCROTAL VARICES: ICD-10-CM

## 2024-12-20 DIAGNOSIS — N46.9 MALE INFERTILITY, UNSPECIFIED: ICD-10-CM

## 2024-12-20 DIAGNOSIS — N46.01 ORGANIC AZOOSPERMIA: ICD-10-CM

## 2024-12-20 PROCEDURE — 99214 OFFICE O/P EST MOD 30 MIN: CPT

## 2024-12-20 PROCEDURE — G2211 COMPLEX E/M VISIT ADD ON: CPT | Mod: NC

## 2025-03-21 ENCOUNTER — APPOINTMENT (OUTPATIENT)
Dept: UROLOGY | Facility: CLINIC | Age: 25
End: 2025-03-21
Payer: MEDICAID

## 2025-03-21 VITALS
BODY MASS INDEX: 21.21 KG/M2 | TEMPERATURE: 97.3 F | WEIGHT: 132 LBS | HEIGHT: 66 IN | HEART RATE: 83 BPM | SYSTOLIC BLOOD PRESSURE: 142 MMHG | DIASTOLIC BLOOD PRESSURE: 84 MMHG

## 2025-03-21 DIAGNOSIS — I86.1 SCROTAL VARICES: ICD-10-CM

## 2025-03-21 DIAGNOSIS — N46.01 ORGANIC AZOOSPERMIA: ICD-10-CM

## 2025-03-21 DIAGNOSIS — N46.9 MALE INFERTILITY, UNSPECIFIED: ICD-10-CM

## 2025-03-21 DIAGNOSIS — R79.89 OTHER SPECIFIED ABNORMAL FINDINGS OF BLOOD CHEMISTRY: ICD-10-CM

## 2025-03-21 PROCEDURE — 99214 OFFICE O/P EST MOD 30 MIN: CPT

## 2025-04-28 ENCOUNTER — APPOINTMENT (OUTPATIENT)
Dept: UROLOGY | Facility: CLINIC | Age: 25
End: 2025-04-28

## 2025-04-28 DIAGNOSIS — N46.9 MALE INFERTILITY, UNSPECIFIED: ICD-10-CM

## 2025-04-28 DIAGNOSIS — N46.01 ORGANIC AZOOSPERMIA: ICD-10-CM

## 2025-04-28 DIAGNOSIS — R79.89 OTHER SPECIFIED ABNORMAL FINDINGS OF BLOOD CHEMISTRY: ICD-10-CM

## 2025-04-28 DIAGNOSIS — E80.6 OTHER DISORDERS OF BILIRUBIN METABOLISM: ICD-10-CM

## 2025-04-28 PROCEDURE — 99214 OFFICE O/P EST MOD 30 MIN: CPT | Mod: 95
